# Patient Record
(demographics unavailable — no encounter records)

---

## 2020-09-11 NOTE — CT
CT pulmonary lung scan without IV contrast



INDICATION: Lung cancer screening protocol; 66-year-old female former smoker since 16 years of age; s
mokes less than 1 pack per day; quit 5 years ago



COMPARISON: None



FINDINGS:



LUNGS:

Nodules\mass: There is a 8.6 mm solid nodule within the posterior right lower lobe on image 123 of se
fernando 3. There is a groundglass pulmonary nodule measuring 6 mm in the right lower lobe on image

129 of series 3. There is a sub-4 mm pulmonary nodule in the right upper lobe on image 39 of series 3
. There is a 3.4 mm noncalcified pulmonary nodule in the right lung apex on image 30 of series 3.

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



Emphysema:



Additional findings: None.



Mediastinum: There are thoracic aorta and coronary artery calcifications. There are mild pericardial 
calcination seen along the posterior aspect of the heart.



Upper abdomen: There is a 3.2 cm hypodense mass in the right hepatic lobe on image 161 of series 2.



Osseous structures: There is thoracolumbar scoliosis with diffuse osteopenia..



IMPRESSION:



Lung RADS category 4: Suspicious finding requiring clinical evaluation. There is an 8.6 mm solid nodu
le in the posterior right lower lobe. An additional groundglass 6 mm pulmonary nodule is seen

within the right lower lobe. Sub-4 mm pulmonary nodules are present within the right upper lobe. Raúl
mmend pulmonary consultation to determine direction of further evaluation.



Category S: 3.2 cm right hepatic lobe mass requires further evaluation. Recommend a CT of the abdomen
 with and without contrast utilizing a hemangioma protocol for further evaluation.



Category C: Not applicable.



Reported By: Meet Medina 

Electronically Signed:  9/11/2020 2:32 PM